# Patient Record
Sex: MALE | Race: WHITE | NOT HISPANIC OR LATINO | Employment: OTHER | URBAN - METROPOLITAN AREA
[De-identification: names, ages, dates, MRNs, and addresses within clinical notes are randomized per-mention and may not be internally consistent; named-entity substitution may affect disease eponyms.]

---

## 2021-06-07 ENCOUNTER — OFFICE VISIT (OUTPATIENT)
Dept: DERMATOLOGY | Facility: CLINIC | Age: 76
End: 2021-06-07
Payer: MEDICARE

## 2021-06-07 VITALS — OXYGEN SATURATION: 95 % | SYSTOLIC BLOOD PRESSURE: 120 MMHG | HEART RATE: 50 BPM | DIASTOLIC BLOOD PRESSURE: 73 MMHG

## 2021-06-07 DIAGNOSIS — L21.9 DERMATITIS, SEBORRHEIC: Primary | ICD-10-CM

## 2021-06-07 PROCEDURE — 99203 OFFICE O/P NEW LOW 30 MIN: CPT | Performed by: PHYSICIAN ASSISTANT

## 2021-06-07 RX ORDER — METOPROLOL TARTRATE 25 MG/1
25 TABLET, FILM COATED ORAL
COMMUNITY
Start: 2020-07-24

## 2021-06-07 RX ORDER — ATORVASTATIN CALCIUM 80 MG/1
80 TABLET, FILM COATED ORAL
COMMUNITY
Start: 2020-07-24

## 2021-06-07 RX ORDER — BETAMETHASONE DIPROPIONATE 0.5 MG/G
CREAM TOPICAL
COMMUNITY
Start: 2020-11-20

## 2021-06-07 NOTE — LETTER
6/7/2021         RE: Sid Calixto  [id number] [facility] [id number]        Dear Colleague,    Thank you for referring your patient, Sid Calixto, to the [facility]. Please see a copy of my visit note below.    HPI:   Chief complaints: Sid Calixto is a pleasant 75 year old male who presents for evaluation of an itchy flaky rash on the neck, scalp, forehead, ears and corners of nose. The rash has been present for several months. He was prescribed betamethasone cream which has helped. He also felt that OTC hydrocortisone helped quite a bit as well. The rash is almost gone today but he had a few persistent areas on the neck.       PHYSICAL EXAM:    /73   Pulse 50   SpO2 95%   Skin exam performed as follows: Type 2 skin. Mood appropriate  Alert and Oriented X 3. Well developed, well nourished in no distress.  General appearance: Normal  Head including face: Normal  Eyes: conjunctiva and lids: Normal  Mouth: Lips, teeth, gums: Normal  Neck: Normal  Chest-breast/axillae: Normal  Back: Normal  Spleen and liver: Normal  Cardiovascular: Exam of peripheral vascular system by observation for swelling, varicosities, edema: Normal  Genitalia: groin, buttocks: Normal  Extremities: digits/nails (clubbing): Normal  Eccrine and Apocrine glands: Normal  Right upper extremity: Normal  Left upper extremity: Normal  Right lower extremity: Normal  Left lower extremity: Normal  Skin: Scalp and body hair: See below    1. Flaking on the scalp, posterior neck    ASSESSMENT/PLAN:     Seborrheic dermatitis - advised on chronic, recurrent condition. Discussed that it is a reaction to the normal yeast on the skin. He prefers to continue OTC hydrocortisone PRN.         Follow-up: PRN  CC:   Scribed By: Barby Reece, MS, BRIAN          Again, thank you for allowing me to participate in the care of your patient.        Sincerely,        Barby Reece PA-C

## 2021-06-07 NOTE — PROGRESS NOTES
HPI:   Chief complaints: Sid Calixto is a pleasant 75 year old male who presents for evaluation of an itchy flaky rash on the neck, scalp, forehead, ears and corners of nose. The rash has been present for several months. He was prescribed betamethasone cream which has helped. He also felt that OTC hydrocortisone helped quite a bit as well. The rash is almost gone today but he had a few persistent areas on the neck.       PHYSICAL EXAM:    /73   Pulse 50   SpO2 95%   Skin exam performed as follows: Type 2 skin. Mood appropriate  Alert and Oriented X 3. Well developed, well nourished in no distress.  General appearance: Normal  Head including face: Normal  Eyes: conjunctiva and lids: Normal  Mouth: Lips, teeth, gums: Normal  Neck: Normal  Chest-breast/axillae: Normal  Back: Normal  Spleen and liver: Normal  Cardiovascular: Exam of peripheral vascular system by observation for swelling, varicosities, edema: Normal  Genitalia: groin, buttocks: Normal  Extremities: digits/nails (clubbing): Normal  Eccrine and Apocrine glands: Normal  Right upper extremity: Normal  Left upper extremity: Normal  Right lower extremity: Normal  Left lower extremity: Normal  Skin: Scalp and body hair: See below    1. Flaking on the scalp, posterior neck    ASSESSMENT/PLAN:     Seborrheic dermatitis - advised on chronic, recurrent condition. Discussed that it is a reaction to the normal yeast on the skin. He prefers to continue OTC hydrocortisone PRN.         Follow-up: PRN  CC:   Scribed By: Barby Reece, MS, [id number]